# Patient Record
(demographics unavailable — no encounter records)

---

## 2024-11-22 NOTE — CHRONIC CARE ASSESSMENT
[de-identified] : adequate social support  [FreeTextEntry1] : Regular diet  [de-identified] : Follow up PCP based on cholesterol levels

## 2024-11-22 NOTE — PHYSICAL EXAM
[de-identified] : obese, cooperative [de-identified] : lower abd tenderness [de-identified] : healing blisters on right side of forehead

## 2024-11-22 NOTE — HISTORY OF PRESENT ILLNESS
[FreeTextEntry1] : 9 hours x 7 days [FreeTextEntry2] : 81 yo F hypertension, hyperlipidemia, atrial fibrillation, obesity,  .  c/o "boils" on her forehead 2 weeks ago. Improved but still itchy HTN- on metoprolol ER 50 mg. losartan 50, High chol- rosuvostatin 20 Dementia- donepazil 5. Forgetful. No trouble swallowing, walks with assist insomnia and agitation. Started on trazodone 25 with some improvement. still gets agitated afib- eliquis, metoprolol DM- januvia 25. Knee arthritis-  on gabapentin 300 BID , diclofenac gel, tylenol.  Incontinent. Needs more chux _________________________________________ CDPAS 9 hours / day-- requesting more hours

## 2025-03-27 NOTE — HISTORY OF PRESENT ILLNESS
[A] : A [Patient] : patient [Family Member] : family member [FreeTextEntry1] : 9 hours x 7 days [FreeTextEntry2] : 83 yo F hypertension, hyperlipidemia, atrial fibrillation, obesity,  .   HTN- on metoprolol ER 50 mg. losartan 50, High chol- rosuvostatin 20 Dementia- donepazil 5. Forgetful. No trouble swallowing, walks with assist insomnia and agitation. Started on trazodone 25 with some improvement. still gets agitated afib- eliquis, metoprolol DM- januvia 25. a1c 6.4 CKD-26 Knee arthritis-  on gabapentin 300 BID , diclofenac gel, tylenol.  Incontinent. Needs more chux _________________________________________ CDPAS 9 hours / day-- requesting more hours

## 2025-03-27 NOTE — CHRONIC CARE ASSESSMENT
[PPS Score: ____] : Palliative Performance Scale (PPS) Score: [unfilled] [FAST Score: ____] : Functional Assessment Scale (FAST) Score: [unfilled] [Class I] : New [place] Heart Association Class Output: Class I [Can not Exercise (Disability)] : Exercise: The patient can not exercise due to disability [None] : The patient does not exercise [General Adherence] : and is generally adherent [High In Cholesterol] : high in cholesterol [de-identified] : adequate social support  [FreeTextEntry1] : Regular diet  [de-identified] : Follow up PCP based on cholesterol levels

## 2025-03-27 NOTE — PHYSICAL EXAM
[Normal Outer Ear/Nose] : the ears and nose were normal in appearance [No Respiratory Distress] : no respiratory distress [Clear to Auscultation] : lungs were clear to auscultation bilaterally [Normal Rate] : heart rate was normal  [Regular Rhythm] : with a regular rhythm [Normal Bowel Sounds] : normal bowel sounds [No Motor Deficits] : the motor exam was normal [Normal Affect] : the affect was normal [de-identified] : obese, cooperative [de-identified] : lower abd tenderness [de-identified] : healing blisters on right side of forehead

## 2025-03-27 NOTE — COUNSELING
[Obese -  ( BMI  >29.9 )] : obese - ( BMI  >29.9 ) [TLC diet recommended] : TLC diet recommended [Non - Smoker] : non-smoker [Smoke/CO Detectors] : smoke/CO detectors [Use grab bars] : use grab bars [Use assistive device to avoid falls] : use assistive device to avoid falls [___] : [unfilled] [] : eye exam [Completed] : Aspirin use discussion completed [Improve exercise tolerance] : improve exercise tolerance [Improve mobility] : improve mobility [Improve pain control] : improve pain control [Maintain functional ability] : maintain functional ability [Likely to achieve goals/desired outcomes] : likely to achieve goals/desired outcomes [Patient/Caregiver not ready to engage in discussion] : patient/caregiver not ready to engage in discussion [Full Code] : Code Status: Full Code [FreeTextEntry2] : medical alert recommended

## 2025-03-27 NOTE — HEALTH RISK ASSESSMENT
[HRA Reviewed] : Health risk assessment reviewed [Independent] : feeding [Some assistance needed] : using telephone [Full assistance needed] : managing finances [No falls in past year] : Patient reported no falls in the past year

## 2025-05-08 NOTE — CHRONIC CARE ASSESSMENT
[PPS Score: ____] : Palliative Performance Scale (PPS) Score: [unfilled] [FAST Score: ____] : Functional Assessment Scale (FAST) Score: [unfilled] [Class I] : New [place] Heart Association Class Output: Class I [Can not Exercise (Disability)] : Exercise: The patient can not exercise due to disability [None] : The patient does not exercise [General Adherence] : and is generally adherent [High In Cholesterol] : high in cholesterol [de-identified] : adequate social support  [FreeTextEntry1] : Regular diet  [de-identified] : Follow up PCP based on cholesterol levels

## 2025-05-08 NOTE — PHYSICAL EXAM
[Normal Outer Ear/Nose] : the ears and nose were normal in appearance [No Respiratory Distress] : no respiratory distress [Clear to Auscultation] : lungs were clear to auscultation bilaterally [Normal Rate] : heart rate was normal  [Regular Rhythm] : with a regular rhythm [Normal Bowel Sounds] : normal bowel sounds [No Motor Deficits] : the motor exam was normal [Normal Affect] : the affect was normal [Non Tender] : non-tender [Soft] : abdomen soft [de-identified] : obese, cooperative [de-identified] : healing blisters on right side of forehead

## 2025-05-08 NOTE — PHYSICAL EXAM
[Normal Outer Ear/Nose] : the ears and nose were normal in appearance [No Respiratory Distress] : no respiratory distress [Clear to Auscultation] : lungs were clear to auscultation bilaterally [Normal Rate] : heart rate was normal  [Regular Rhythm] : with a regular rhythm [Normal Bowel Sounds] : normal bowel sounds [No Motor Deficits] : the motor exam was normal [Normal Affect] : the affect was normal [Non Tender] : non-tender [Soft] : abdomen soft [de-identified] : obese, cooperative [de-identified] : healing blisters on right side of forehead

## 2025-05-08 NOTE — CHRONIC CARE ASSESSMENT
[PPS Score: ____] : Palliative Performance Scale (PPS) Score: [unfilled] [FAST Score: ____] : Functional Assessment Scale (FAST) Score: [unfilled] [Class I] : New [place] Heart Association Class Output: Class I [Can not Exercise (Disability)] : Exercise: The patient can not exercise due to disability [None] : The patient does not exercise [General Adherence] : and is generally adherent [High In Cholesterol] : high in cholesterol [de-identified] : adequate social support  [FreeTextEntry1] : Regular diet  [de-identified] : Follow up PCP based on cholesterol levels

## 2025-05-08 NOTE — COUNSELING
[Obese -  ( BMI  >29.9 )] : obese - ( BMI  >29.9 ) [TLC diet recommended] : TLC diet recommended [Non - Smoker] : non-smoker [Smoke/CO Detectors] : smoke/CO detectors [Use grab bars] : use grab bars [Use assistive device to avoid falls] : use assistive device to avoid falls [___] : [unfilled] [] : foot exam [Completed] : Aspirin use discussion completed [Improve exercise tolerance] : improve exercise tolerance [Improve mobility] : improve mobility [Improve pain control] : improve pain control [Maintain functional ability] : maintain functional ability [Likely to achieve goals/desired outcomes] : likely to achieve goals/desired outcomes [Patient/Caregiver not ready to engage in discussion] : patient/caregiver not ready to engage in discussion [Full Code] : Code Status: Full Code [FreeTextEntry2] : medical alert recommended

## 2025-05-08 NOTE — HISTORY OF PRESENT ILLNESS
[A] : A [Patient] : patient [Family Member] : family member [FreeTextEntry1] : 9 hours x 7 days [FreeTextEntry2] : 84 yo F hypertension, hyperlipidemia, atrial fibrillation, obesity,  .  Shigles. gabapentin 400 TID given last visit. Helps some but taking only BID (I take too many meds) HTN- on metoprolol ER 50 mg. losartan 50, High chol- rosuvostatin 20 Dementia- donepazil 5. Forgetful. No trouble swallowing, walks with assist insomnia and agitation. Taking trazodone 50 with some improvement.  afib- eliquis, metoprolol. On med review does not have eliquis. Unclear when she stopped it DM- januvia 25. a1c 6.4 CKD-26 some abd discomfort. regular BMs  _________________________________________ CDPAS 9 hours / day-- requesting more hours

## 2025-07-22 NOTE — CHRONIC CARE ASSESSMENT
[PPS Score: ____] : Palliative Performance Scale (PPS) Score: [unfilled] [FAST Score: ____] : Functional Assessment Scale (FAST) Score: [unfilled] [Class I] : New [place] Heart Association Class Output: Class I [Can not Exercise (Disability)] : Exercise: The patient can not exercise due to disability [None] : The patient does not exercise [General Adherence] : and is generally adherent [High In Cholesterol] : high in cholesterol [de-identified] : adequate social support  [FreeTextEntry1] : Regular diet  [de-identified] : Follow up PCP based on cholesterol levels

## 2025-07-22 NOTE — PHYSICAL EXAM
[Normal Outer Ear/Nose] : the ears and nose were normal in appearance [No Respiratory Distress] : no respiratory distress [Clear to Auscultation] : lungs were clear to auscultation bilaterally [Normal Rate] : heart rate was normal  [Regular Rhythm] : with a regular rhythm [Normal Bowel Sounds] : normal bowel sounds [Non Tender] : non-tender [Soft] : abdomen soft [No Motor Deficits] : the motor exam was normal [Normal Affect] : the affect was normal [No Acute Distress] : no acute distress [de-identified] : obese, cooperative

## 2025-07-22 NOTE — HISTORY OF PRESENT ILLNESS
[A] : A [Patient] : patient [Family Member] : family member [FreeTextEntry2] : 82 yo F hypertension, hyperlipidemia, atrial fibrillation, obesity,  .  [place]  [facility] [place] pain, acute on chronic respiratory failure. Sent home with prednisone taper Chronic pain- gabapentin 400 TID given  HTN- on metoprolol ER 50 mg. losartan 50, amlodipine 10 High chol- rosuvostatin 20. LDL 39 Dementia- donepazil 5. Forgetful. No trouble swallowing, walks w ith assist.  insomnia and agitation. Sleeping well with trazodone 50 afib- eliquis, metoprolol.  DM- januvia 25. a1c 7.1 CKD-34 hgb 11.1  _________________________________________ CDPAS 9 hours / day-- requesting more hours

## 2025-07-22 NOTE — HISTORY OF PRESENT ILLNESS
[A] : A [Patient] : patient [Family Member] : family member [FreeTextEntry2] : 84 yo F hypertension, hyperlipidemia, atrial fibrillation, obesity,  .  Pt admitted  [facility] [place] 7/8-7/18 for chest pain, acute on chronic respiratory failure. Sent home with prednisone taper Chronic pain- gabapentin 400 TID given  HTN- on metoprolol ER 50 mg. losartan 50, amlodipine 10 High chol- rosuvostatin 20. LDL 39 Dementia- donepazil 5. Forgetful. No trouble swallowing, walks w ith assist.  insomnia and agitation. Sleeping well with trazodone 50 afib- eliquis, metoprolol.  DM- januvia 25. a1c 7.1 CKD-34 hgb 11.1  _________________________________________ CDPAS 9 hours / day-- requesting more hours

## 2025-07-22 NOTE — PHYSICAL EXAM
[Normal Outer Ear/Nose] : the ears and nose were normal in appearance [No Respiratory Distress] : no respiratory distress [Clear to Auscultation] : lungs were clear to auscultation bilaterally [Normal Rate] : heart rate was normal  [Regular Rhythm] : with a regular rhythm [Normal Bowel Sounds] : normal bowel sounds [Non Tender] : non-tender [Soft] : abdomen soft [No Motor Deficits] : the motor exam was normal [Normal Affect] : the affect was normal [No Acute Distress] : no acute distress [de-identified] : obese, cooperative

## 2025-07-22 NOTE — CHRONIC CARE ASSESSMENT
[PPS Score: ____] : Palliative Performance Scale (PPS) Score: [unfilled] [FAST Score: ____] : Functional Assessment Scale (FAST) Score: [unfilled] [Class I] : [place] Heart Association Class Output: Class I [Can not Exercise (Disability)] : Exercise: The patient can not exercise due to disability [None] : The patient does not exercise [General Adherence] : and is generally adherent [High In Cholesterol] : high in cholesterol [de-identified] : adequate social support  [FreeTextEntry1] : Regular diet  [de-identified] : Follow up PCP based on cholesterol levels